# Patient Record
Sex: MALE | Race: WHITE | NOT HISPANIC OR LATINO | Employment: FULL TIME | URBAN - NONMETROPOLITAN AREA
[De-identification: names, ages, dates, MRNs, and addresses within clinical notes are randomized per-mention and may not be internally consistent; named-entity substitution may affect disease eponyms.]

---

## 2022-11-08 ENCOUNTER — OFFICE VISIT (OUTPATIENT)
Dept: URGENT CARE | Facility: CLINIC | Age: 47
End: 2022-11-08

## 2022-11-08 VITALS
RESPIRATION RATE: 20 BRPM | WEIGHT: 202 LBS | DIASTOLIC BLOOD PRESSURE: 66 MMHG | TEMPERATURE: 98.3 F | SYSTOLIC BLOOD PRESSURE: 113 MMHG | HEART RATE: 80 BPM | OXYGEN SATURATION: 97 %

## 2022-11-08 DIAGNOSIS — M54.50 ACUTE RIGHT-SIDED LOW BACK PAIN WITHOUT SCIATICA: Primary | ICD-10-CM

## 2022-11-08 RX ORDER — METHYLPREDNISOLONE SODIUM SUCCINATE 125 MG/2ML
125 INJECTION, POWDER, LYOPHILIZED, FOR SOLUTION INTRAMUSCULAR; INTRAVENOUS ONCE
Status: DISCONTINUED | OUTPATIENT
Start: 2022-11-08 | End: 2022-11-08

## 2022-11-08 RX ORDER — METHOCARBAMOL 750 MG/1
750 TABLET, FILM COATED ORAL EVERY 8 HOURS SCHEDULED
Qty: 15 TABLET | Refills: 0 | Status: SHIPPED | OUTPATIENT
Start: 2022-11-08 | End: 2022-11-13

## 2022-11-08 RX ORDER — METHYLPREDNISOLONE SODIUM SUCCINATE 125 MG/2ML
125 INJECTION, POWDER, LYOPHILIZED, FOR SOLUTION INTRAMUSCULAR; INTRAVENOUS ONCE
Status: COMPLETED | OUTPATIENT
Start: 2022-11-08 | End: 2022-11-08

## 2022-11-08 RX ADMIN — METHYLPREDNISOLONE SODIUM SUCCINATE 125 MG: 125 INJECTION, POWDER, LYOPHILIZED, FOR SOLUTION INTRAMUSCULAR; INTRAVENOUS at 15:34

## 2022-11-08 NOTE — PROGRESS NOTES
[facility]        NAME: Chioma Hawkins is a 50 y o  male  : 1975    MRN: [id number]  DATE: 2022  TIME: 3:30 PM    Assessment and Plan   Acute right-sided low back pain without sciatica [M54 50]  1  Acute right-sided low back pain without sciatica  methocarbamol (Robaxin-750) 750 mg tablet    methylPREDNISolone sodium succinate (Solu-MEDROL) injection 125 mg    DISCONTINUED: methylPREDNISolone sodium succinate (Solu-MEDROL) injection 125 mg         Patient Instructions   Patient Instructions   Back Pain   WHAT YOU NEED TO KNOW:   Back pain is common  You may have back pain and muscle spasms  You may feel sore or stiff on one or both sides of your back  The pain may spread to your lower body  Conditions that affect the spine, joints, or muscles can cause back pain  These may include arthritis, spinal stenosis (narrowing of the spinal column), muscle tension, or breakdown of the spinal discs  DISCHARGE INSTRUCTIONS:   Call your local emergency number (911 in the [address] [place] [address],[address]) if:   · You have severe back pain with chest pain  · You cannot control your urine or bowel movements  · Your pain becomes so severe that you cannot walk  Return to the emergency department if:   · You have pain, numbness, or weakness in one or both legs  · You have severe back pain, nausea, and vomiting  · You have severe back pain that spreads to your side or genital area  Call your doctor if:   · You have back pain that does not get better with rest and pain medicine  · You have a fever  · You have pain that worsens when you are on your back or when you rest     · You have pain that worsens when you cough or sneeze  · You lose weight without trying  · You have questions or concerns about your condition or care  Medicines: You may need any of the following:  · NSAIDs , such as ibuprofen, help decrease swelling, pain, and fever   This medicine is available with or without a doctor's order  NSAIDs can cause stomach bleeding or kidney problems in certain people  If you take blood thinner medicine, always ask your healthcare provider if NSAIDs are safe for you  Always read the medicine label and follow directions  · Acetaminophen  decreases pain and fever  It is available without a doctor's order  Ask how much to take and how often to take it  Follow directions  Read the labels of all other medicines you are using to see if they also contain acetaminophen, or ask your doctor or pharmacist  Acetaminophen can cause liver damage if not taken correctly  Do not use more than 4 grams (4,000 milligrams) total of acetaminophen in one day  · Muscle relaxers  help decrease muscle spasms and back pain  · Prescription pain medicine  may be given  Ask your healthcare provider how to take this medicine safely  Some prescription pain medicines contain acetaminophen  Do not take other medicines that contain acetaminophen without talking to your healthcare provider  Too much acetaminophen may cause liver damage  Prescription pain medicine may cause constipation  Ask your healthcare provider how to prevent or treat constipation  · Take your medicine as directed  Contact your healthcare provider if you think your medicine is not helping or if you have side effects  Tell him or her if you are allergic to any medicine  Keep a list of the medicines, vitamins, and herbs you take  Include the amounts, and when and why you take them  Bring the list or the pill bottles to follow-up visits  Carry your medicine list with you in case of an emergency  How to manage your back pain:   · Apply ice  on your back for 15 to 20 minutes every hour or as directed  Use an ice pack, or put crushed ice in a plastic bag  Cover it with a towel before you apply it to your skin  Ice helps prevent tissue damage and decreases pain      · Apply heat  on your back for 20 to 30 minutes every 2 hours for as many days as directed  Heat helps decrease pain and muscle spasms  · Stay active  as much as you can without causing more pain  Bed rest could make your back pain worse  Avoid heavy lifting until your pain is gone  · Go to physical therapy as directed  A physical therapist can teach you exercises to help improve movement and strength, and to decrease pain  Follow up with your doctor in 2 weeks, or as directed: You might need to see a specialist  Write down your questions so you remember to ask them during your visits  © Copyright [employer] 2022 Information is for End User's use only and may not be sold, redistributed or otherwise used for commercial purposes  All illustrations and images included in CareNotes® are the copyrighted property of A D A M , Inc  or [employer]   The above information is an  only  It is not intended as medical advice for individual conditions or treatments  Talk to your doctor, nurse or pharmacist before following any medical regimen to see if it is safe and effective for you  Follow up with PCP in 3-5 days  Proceed to  ER if symptoms worsen  Chief Complaint     Chief Complaint   Patient presents with   • Back Pain     Pulled muscle in lower back on Sunday while raking leaves  History of Present Illness       The patient is a 47-year-old male who presents to the clinic complaining of lower back pain that started on Sunday  He states that he noticed the pain after raking leaves this on Sunday  He describes the pain as a sharp pain that is located on the right lower back and radiates to his right hip  He denies radiation to his legs  The patient also denies saddle numbness, numbness of his legs, or generalized weakness  He states he does not have a history of significant lumbar disc disease but does state that he had lower back pain in the past which was many years ago    He states that he has been taking Tylenol for his pain which does help a little bit as he cannot take NSAIDs due to history of ulcers  He states that he can tolerate steroids  He denies associated fevers, chills, nausea, vomiting, or urinary symptoms  Review of Systems   Review of Systems   Constitutional: Negative for chills and fever  HENT: Negative for ear pain and sore throat  Eyes: Negative for pain and visual disturbance  Respiratory: Negative for cough and shortness of breath  Cardiovascular: Negative for chest pain and palpitations  Gastrointestinal: Negative for abdominal pain and vomiting  Genitourinary: Negative for dysuria and hematuria  Musculoskeletal: Positive for back pain and joint swelling  Negative for arthralgias  Skin: Negative for color change and rash  Neurological: Negative for seizures and syncope  All other systems reviewed and are negative  Current Medications       Current Outpatient Medications:   •  methocarbamol (Robaxin-750) 750 mg tablet, Take 1 tablet (750 mg total) by mouth every 8 (eight) hours for 5 days, Disp: 15 tablet, Rfl: 0    Current Facility-Administered Medications:   •  methylPREDNISolone sodium succinate (Solu-MEDROL) injection 125 mg, 125 mg, Intramuscular, Once, [facility][1], [place]-    Current Allergies     Allergies as of 11/08/2022 - Reviewed 11/08/2022   Allergen Reaction Noted   • Penicillins Edema 11/08/2022            The following portions of the patient's history were reviewed and updated as appropriate: allergies, current medications, past family history, past medical history, past social history, past surgical history and problem list      History reviewed  No pertinent past medical history  History reviewed  No pertinent surgical history  History reviewed  No pertinent family history  Medications have been verified          Objective   /66   Pulse 80   Temp 98 3 °F (36 8 °C)   Resp 20   Wt 91 6 kg (202 lb)   SpO2 97%        Physical Exam     Physical Exam  Constitutional:       Appearance: He is well-developed  HENT:      Head: Normocephalic  Eyes:      General:         Left eye: No discharge  Pupils: Pupils are equal, round, and reactive to light  Neck:      Thyroid: No thyromegaly  Trachea: No tracheal deviation  Cardiovascular:      Rate and Rhythm: Normal rate and regular rhythm  Heart sounds: No murmur heard  Pulmonary:      Effort: Pulmonary effort is normal  No respiratory distress  Breath sounds: No wheezing or rales  Chest:      Chest wall: No tenderness  Abdominal:      General: Bowel sounds are normal  There is no distension  Palpations: Abdomen is soft  There is no mass  Tenderness: There is no abdominal tenderness  There is no guarding or rebound  Musculoskeletal:      Cervical back: Normal range of motion  Lumbar back: Spasms present  No swelling, edema, deformity, signs of trauma, lacerations or bony tenderness  Normal range of motion  Positive left straight leg raise test         Back:       Comments: -there is paraspinal tenderness noted at the right lower lumbar  There is no significant edema noted in the spinal area  The patient does have a positive straight leg test on the right  He has limited range of motion to flexion, extension, and rotation of the LS spine  Skin:     General: Skin is warm  Neurological:      Mental Status: He is alert               -the patient's symptoms are likely muscular as he does not have any signs or symptoms of radiculopathy  I will treat him with a steroid injection and muscle relaxer  I suggest follow-up with PCP or go to the ER if symptoms worsen

## 2022-11-08 NOTE — LETTER
November 8, 2022     Patient: Latosha Garza   YOB: 1975   Date of Visit: 11/8/2022       To Whom it May Concern:    Ricardo Hawkins was seen in my clinic on 11/8/2022  He may return to work on 11/14/2022  If you have any questions or concerns, please don't hesitate to call  Sincerely,          Carol Orozco PA-C        CC: Eva Merida

## 2022-11-08 NOTE — PATIENT INSTRUCTIONS
Back Pain   WHAT YOU NEED TO KNOW:   Back pain is common  You may have back pain and muscle spasms  You may feel sore or stiff on one or both sides of your back  The pain may spread to your lower body  Conditions that affect the spine, joints, or muscles can cause back pain  These may include arthritis, spinal stenosis (narrowing of the spinal column), muscle tension, or breakdown of the spinal discs  DISCHARGE INSTRUCTIONS:   Call your local emergency number (911 in the [address] [facility],[address]) if:   You have severe back pain with chest pain  You cannot control your urine or bowel movements  Your pain becomes so severe that you cannot walk  Return to the emergency department if:   You have pain, numbness, or weakness in one or both legs  You have severe back pain, nausea, and vomiting  You have severe back pain that spreads to your side or genital area  Call your doctor if:   You have back pain that does not get better with rest and pain medicine  You have a fever  You have pain that worsens when you are on your back or when you rest     You have pain that worsens when you cough or sneeze  You lose weight without trying  You have questions or concerns about your condition or care  Medicines: You may need any of the following:  NSAIDs , such as ibuprofen, help decrease swelling, pain, and fever  This medicine is available with or without a doctor's order  NSAIDs can cause stomach bleeding or kidney problems in certain people  If you take blood thinner medicine, always ask your healthcare provider if NSAIDs are safe for you  Always read the medicine label and follow directions  Acetaminophen  decreases pain and fever  It is available without a doctor's order  Ask how much to take and how often to take it  Follow directions   Read the labels of all other medicines you are using to see if they also contain acetaminophen, or ask your doctor or pharmacist  Acetaminophen can cause liver damage if not taken correctly  Do not use more than 4 grams (4,000 milligrams) total of acetaminophen in one day  Muscle relaxers  help decrease muscle spasms and back pain  Prescription pain medicine  may be given  Ask your healthcare provider how to take this medicine safely  Some prescription pain medicines contain acetaminophen  Do not take other medicines that contain acetaminophen without talking to your healthcare provider  Too much acetaminophen may cause liver damage  Prescription pain medicine may cause constipation  Ask your healthcare provider how to prevent or treat constipation  Take your medicine as directed  Contact your healthcare provider if you think your medicine is not helping or if you have side effects  Tell him or her if you are allergic to any medicine  Keep a list of the medicines, vitamins, and herbs you take  Include the amounts, and when and why you take them  Bring the list or the pill bottles to follow-up visits  Carry your medicine list with you in case of an emergency  How to manage your back pain:   Apply ice  on your back for 15 to 20 minutes every hour or as directed  Use an ice pack, or put crushed ice in a plastic bag  Cover it with a towel before you apply it to your skin  Ice helps prevent tissue damage and decreases pain  Apply heat  on your back for 20 to 30 minutes every 2 hours for as many days as directed  Heat helps decrease pain and muscle spasms  Stay active  as much as you can without causing more pain  Bed rest could make your back pain worse  Avoid heavy lifting until your pain is gone  Go to physical therapy as directed  A physical therapist can teach you exercises to help improve movement and strength, and to decrease pain  Follow up with your doctor in 2 weeks, or as directed: You might need to see a specialist  Write down your questions so you remember to ask them during your visits    © Copyright [employer] 2022 Information is for End User's use only and may not be sold, redistributed or otherwise used for commercial purposes  All illustrations and images included in CareNotes® are the copyrighted property of A [employer] A M , Inc  or [employer]  The above information is an  only  It is not intended as medical advice for individual conditions or treatments  Talk to your doctor, nurse or pharmacist before following any medical regimen to see if it is safe and effective for you

## 2023-12-20 ENCOUNTER — OFFICE VISIT (OUTPATIENT)
Dept: URGENT CARE | Facility: CLINIC | Age: 48
End: 2023-12-20
Payer: COMMERCIAL

## 2023-12-20 VITALS
OXYGEN SATURATION: 97 % | RESPIRATION RATE: 20 BRPM | DIASTOLIC BLOOD PRESSURE: 76 MMHG | HEART RATE: 91 BPM | SYSTOLIC BLOOD PRESSURE: 136 MMHG | TEMPERATURE: 98.7 F | WEIGHT: 204.8 LBS

## 2023-12-20 DIAGNOSIS — H92.02 OTALGIA, LEFT EAR: ICD-10-CM

## 2023-12-20 DIAGNOSIS — J06.9 ACUTE RESPIRATORY DISEASE: Primary | ICD-10-CM

## 2023-12-20 LAB
SARS-COV-2 AG UPPER RESP QL IA: NEGATIVE
VALID CONTROL: NORMAL

## 2023-12-20 PROCEDURE — 87811 SARS-COV-2 COVID19 W/OPTIC: CPT

## 2023-12-20 PROCEDURE — G0382 LEV 3 HOSP TYPE B ED VISIT: HCPCS

## 2023-12-20 RX ORDER — FLUTICASONE PROPIONATE 50 MCG
1 SPRAY, SUSPENSION (ML) NASAL 2 TIMES DAILY
Qty: 9.9 ML | Refills: 0 | Status: SHIPPED | OUTPATIENT
Start: 2023-12-20

## 2023-12-20 NOTE — PROGRESS NOTES
"  [facility]        NAME: Riky Lieberman is a 48 y.o. male  : 1975    MRN: [id number]  DATE: 2023  TIME: 3:47 PM    Assessment and Plan   Acute respiratory disease [J06.9]  1. Acute respiratory disease  Poct Covid 19 Rapid Antigen Test    fluticasone (FLONASE) 50 mcg/act nasal spray      2. Otalgia, left ear          Rapid covid negative  No ear infection. Recommend supportive care.    Patient Instructions   Pt appears to have a viral upper respiratory infection. Antibiotics are contraindicated at this time and would not help you feel better faster.     Although the symptoms are troublesome, usually the patient's body is able to recover from a viral infection on an average time of 7-10 days.  Fever, if any, typically resolves after 3-5 days.  If patient has sore throat, typically this resolves within 3-5 days.  Any nasal congestion, runny nose, post nasal drip typically begin to  improve after 7-10 days.  Any cough may linger over a couple weeks.  Please note that having a cough is not necessarily a bad thing.  It often times is part of our body's protective mechanism to help keep our airways clear.      Please note that yellow mucous doesn't necessarily mean a \"bacterial\" infection.  Yellow mucous doesn't automatically mean that an antibiotic is needed.  It is not unusual for mucus to become more discolored in the days after the start of an upper respiratory infection.  Often times this is due to mucous that has thickened  with white blood cells that have flooded the mucosa to try and fight the viral infection.      Ear Pain may occur when the eustachian tubes become blocked with mucous or swollen due to acute inflammation from illness. May give Ibuprofen or Tylenol as needed for comfort.  May also use warm compress against ear for comfort.  If ear ache is persisting and not improving over 2-3 days or if there is any gross drainage coming from ear, please seek further " evaluation.      Natural remedies to help provide comfort for cough/ cold symptoms include: one teaspoon of honey (only in infants over 1 year of age), increased vitamin C (oranges, mady, etc.), maddi, and drinking plenty of fluids. Vaporizer by bedside.    If you should have prolonged symptoms (Greater than 10 days), worsening symptoms, or any new symptoms please seek further medical attention.    If you would be having difficulty breathing, seek further evaluation by calling 911 or proceeding to ER for further evaluation.     Follow up with PCP in 3-5 days.  Proceed to  ER if symptoms worsen.    Chief Complaint     Chief Complaint   Patient presents with    Cold Like Symptoms     Left earache, post nasal drip and cough since Saturday.  Using Tylenol.         History of Present Illness       Patient is a 48 year old male who presents to the office today for left ear pain and cough, congestion, rhinorrhea. Symptoms started on Saturday. Denies fever. Ear feels wet. Has some hearing loss in that ear.         Review of Systems   Review of Systems   Constitutional:  Negative for chills and fever.   HENT:  Positive for congestion, ear pain, hearing loss and rhinorrhea. Negative for tinnitus.    Respiratory:  Positive for cough. Negative for shortness of breath and wheezing.    Cardiovascular:  Negative for chest pain and palpitations.   Gastrointestinal:  Negative for diarrhea, nausea and vomiting.   All other systems reviewed and are negative.        Current Medications       Current Outpatient Medications:     fluticasone (FLONASE) 50 mcg/act nasal spray, 1 spray into each nostril 2 (two) times a day, Disp: 9.9 mL, Rfl: 0    methocarbamol (Robaxin-750) 750 mg tablet, Take 1 tablet (750 mg total) by mouth every 8 (eight) hours for 5 days, Disp: 15 tablet, Rfl: 0    Current Allergies     Allergies as of 12/20/2023 - Reviewed 12/20/2023   Allergen Reaction Noted    Penicillins Edema 11/08/2022            The following  portions of the patient's history were reviewed and updated as appropriate: allergies, current medications, past family history, past medical history, past social history, past surgical history and problem list.     History reviewed. No pertinent past medical history.    History reviewed. No pertinent surgical history.    History reviewed. No pertinent family history.      Medications have been verified.        Objective   /76   Pulse 91   Temp 98.7 °F (37.1 °C)   Resp 20   Wt 92.9 kg (204 lb 12.8 oz)   SpO2 97%        Physical Exam     Physical Exam  Vitals and nursing note reviewed.   Constitutional:       Appearance: Normal appearance. He is normal weight.   HENT:      Right Ear: A middle ear effusion (serous) is present.      Left Ear: A middle ear effusion (serous) is present.      Nose: Congestion present.      Mouth/Throat:      Mouth: Mucous membranes are moist.   Cardiovascular:      Rate and Rhythm: Normal rate and regular rhythm.      Pulses: Normal pulses.      Heart sounds: Normal heart sounds.   Pulmonary:      Effort: Pulmonary effort is normal.      Breath sounds: Normal breath sounds.   Musculoskeletal:         General: Normal range of motion.   Skin:     General: Skin is warm.   Neurological:      Mental Status: He is alert.

## 2023-12-20 NOTE — LETTER
December 20, 2023     Patient: Riky Lieberman   YOB: 1975   Date of Visit: 12/20/2023       To Whom It May Concern:    It is my medical opinion that Riky Lieberman may return to work on 12/21/2023 .    If you have any questions or concerns, please don't hesitate to call.         Sincerely,        DRE Diaz    CC: No Recipients

## 2023-12-20 NOTE — PATIENT INSTRUCTIONS
"Pt appears to have a viral upper respiratory infection. Antibiotics are contraindicated at this time and would not help you feel better faster.     Although the symptoms are troublesome, usually the patient's body is able to recover from a viral infection on an average time of 7-10 days.  Fever, if any, typically resolves after 3-5 days.  If patient has sore throat, typically this resolves within 3-5 days.  Any nasal congestion, runny nose, post nasal drip typically begin to  improve after 7-10 days.  Any cough may linger over a couple weeks.  Please note that having a cough is not necessarily a bad thing.  It often times is part of our body's protective mechanism to help keep our airways clear.      Please note that yellow mucous doesn't necessarily mean a \"bacterial\" infection.  Yellow mucous doesn't automatically mean that an antibiotic is needed.  It is not unusual for mucus to become more discolored in the days after the start of an upper respiratory infection.  Often times this is due to mucous that has thickened  with white blood cells that have flooded the mucosa to try and fight the viral infection.      Ear Pain may occur when the eustachian tubes become blocked with mucous or swollen due to acute inflammation from illness. May give Ibuprofen or Tylenol as needed for comfort.  May also use warm compress against ear for comfort.  If ear ache is persisting and not improving over 2-3 days or if there is any gross drainage coming from ear, please seek further evaluation.      Natural remedies to help provide comfort for cough/ cold symptoms include: one teaspoon of honey (only in infants over 1 year of age), increased vitamin C (oranges, mady, etc.), ginger, and drinking plenty of fluids. Vaporizer by bedside.    If you should have prolonged symptoms (Greater than 10 days), worsening symptoms, or any new symptoms please seek further medical attention.    If you would be having difficulty breathing, seek " further evaluation by calling 911 or proceeding to ER for further evaluation.

## 2024-07-25 ENCOUNTER — OFFICE VISIT (OUTPATIENT)
Dept: URGENT CARE | Facility: CLINIC | Age: 49
End: 2024-07-25
Payer: COMMERCIAL

## 2024-07-25 VITALS
HEART RATE: 72 BPM | DIASTOLIC BLOOD PRESSURE: 60 MMHG | TEMPERATURE: 98.1 F | RESPIRATION RATE: 18 BRPM | SYSTOLIC BLOOD PRESSURE: 110 MMHG | OXYGEN SATURATION: 96 %

## 2024-07-25 DIAGNOSIS — I80.3 PHLEBITIS OF RIGHT LEG: Primary | ICD-10-CM

## 2024-07-25 PROCEDURE — G0382 LEV 3 HOSP TYPE B ED VISIT: HCPCS | Performed by: PHYSICIAN ASSISTANT

## 2024-07-25 RX ORDER — CLINDAMYCIN HYDROCHLORIDE 300 MG/1
300 CAPSULE ORAL 4 TIMES DAILY
Qty: 28 CAPSULE | Refills: 0 | Status: SHIPPED | OUTPATIENT
Start: 2024-07-25 | End: 2024-08-01

## 2024-07-25 NOTE — PROGRESS NOTES
[facility]        NAME: Riky Lieberman is a 48 y.o. male  : 1975    MRN: [id number]  DATE: 2024  TIME: 1:07 PM    Assessment and Plan   Phlebitis of right leg [I80.3]  1. Phlebitis of right leg  clindamycin (CLEOCIN) 300 MG capsule            Patient Instructions   There are no Patient Instructions on file for this visit.      Follow up with PCP in 3-5 days.  Proceed to  ER if symptoms worsen.    Chief Complaint     Chief Complaint   Patient presents with    Leg Pain     Right leg, pain/swelling, noticed it Saturday night         History of Present Illness       Patient is a 48-year-old male with past medical history significant for varicose veins who presents the clinic for pain in his right lower extremity.  He states that he was doing a large amount of walking over the weekend.  He noticed increased swelling over his right leg.  He also noticed some warmth but denies a significant amount of redness.  He was taking 200 mg of ibuprofen 3 times a day for the last few days and the swelling and pain has decreased slightly.  He states that he did miss work due to his pain and needs a note for work.  He denies associated calf tenderness, chest pains, shortness of breath, or hemoptysis.        Review of Systems   Review of Systems   Constitutional:  Negative for chills and fever.   Respiratory:  Negative for apnea.    Cardiovascular:  Negative for chest pain, palpitations and leg swelling.   Musculoskeletal:  Positive for arthralgias.   Skin:  Positive for color change. Negative for pallor, rash and wound.   Neurological:  Negative for numbness.         Current Medications       Current Outpatient Medications:     clindamycin (CLEOCIN) 300 MG capsule, Take 1 capsule (300 mg total) by mouth 4 (four) times a day for 7 days, Disp: 28 capsule, Rfl: 0    Current Allergies     Allergies as of 2024 - Reviewed 2024   Allergen Reaction Noted    Penicillins Edema 2022             The following portions of the patient's history were reviewed and updated as appropriate: allergies, current medications, past family history, past medical history, past social history, past surgical history and problem list.     History reviewed. No pertinent past medical history.    History reviewed. No pertinent surgical history.    No family history on file.      Medications have been verified.        Objective   /60   Pulse 72   Temp 98.1 °F (36.7 °C)   Resp 18   SpO2 96%        Physical Exam     Physical Exam  Musculoskeletal:        Legs:       Comments: -Varicosities are noted on the right lower extremity with warmth noted on the right medial lower extremity.  There is no significant calf tenderness.  Homans' sign is negative.   -There are no signs of acute DVT.         -I suggest using compression stockings.  He declines an NSAID at this time and states that he will use over-the-counter medications.  I will place him on Keflex for infection control.  I did suggest ER if symptoms worsen.  He may need a venous Doppler if redness or swelling increases.

## 2024-07-25 NOTE — LETTER
July 25, 2024     Patient: Riky Lieberman   YOB: 1975   Date of Visit: 7/25/2024       To Whom it May Concern:    Riky Lieberman was seen in my clinic on 7/25/2024. He  was off work 7/24 and 7/25  If you have any questions or concerns, please don't hesitate to call.         Sincerely,          Gonzalez Recinos PA-C        CC: No Recipients

## 2024-09-10 ENCOUNTER — OFFICE VISIT (OUTPATIENT)
Dept: URGENT CARE | Facility: CLINIC | Age: 49
End: 2024-09-10
Payer: COMMERCIAL

## 2024-09-10 VITALS
WEIGHT: 199 LBS | BODY MASS INDEX: 29.47 KG/M2 | OXYGEN SATURATION: 97 % | HEIGHT: 69 IN | HEART RATE: 81 BPM | TEMPERATURE: 97.9 F | SYSTOLIC BLOOD PRESSURE: 116 MMHG | DIASTOLIC BLOOD PRESSURE: 74 MMHG

## 2024-09-10 DIAGNOSIS — R10.9 ABDOMINAL PAIN, UNSPECIFIED ABDOMINAL LOCATION: ICD-10-CM

## 2024-09-10 DIAGNOSIS — R05.1 ACUTE COUGH: Primary | ICD-10-CM

## 2024-09-10 PROCEDURE — G0382 LEV 3 HOSP TYPE B ED VISIT: HCPCS

## 2024-09-10 RX ORDER — DEXTROMETHORPHAN HYDROBROMIDE AND PROMETHAZINE HYDROCHLORIDE 15; 6.25 MG/5ML; MG/5ML
5 SYRUP ORAL 4 TIMES DAILY PRN
Qty: 180 ML | Refills: 0 | Status: SHIPPED | OUTPATIENT
Start: 2024-09-10

## 2024-09-10 NOTE — LETTER
September 10, 2024     Patient: Riky Lieberman   YOB: 1975   Date of Visit: 9/10/2024       To Whom It May Concern:    It is my medical opinion that Riyk Lieberman may return to work on 9/12/2024 .    If you have any questions or concerns, please don't hesitate to call.         Sincerely,        DRE Diaz    CC: No Recipients

## 2024-09-10 NOTE — PATIENT INSTRUCTIONS
Please use the  cough syrup as directed as needed.  Apply heat to the area and use ibuprofen as directed on bottles.

## 2024-09-10 NOTE — PROGRESS NOTES
[facility]        NAME: Riky Lieberman is a 48 y.o. male  : 1975    MRN: [id number]  DATE: 2024  TIME: 9:03 AM    Assessment and Plan   Acute cough [R05.1]  1. Acute cough  promethazine-dextromethorphan (PHENERGAN-DM) 6.25-15 mg/5 mL oral syrup      2. Abdominal pain, unspecified abdominal location          Pain is from coughing. Will trial cough syrup and recommend supportive measures. Work note provided    Patient Instructions   Please use the  cough syrup as directed as needed.  Apply heat to the area and use ibuprofen as directed on bottles.    Follow up with PCP in 3-5 days.  Proceed to  ER if symptoms worsen.    Chief Complaint     Chief Complaint   Patient presents with    Cough     8 days    Flank Pain     From coughing         History of Present Illness       Patient is a 48 year old male who presents to the office today for a cough. Notes that he had a coughing fit last night at work and pinched something on the left side and now has pain. Is taking motrin and inhaler with some relief.     Cough    Flank Pain        Review of Systems   Review of Systems   Respiratory:  Positive for cough.    Genitourinary:  Positive for flank pain.   All other systems reviewed and are negative.        Current Medications       Current Outpatient Medications:     promethazine-dextromethorphan (PHENERGAN-DM) 6.25-15 mg/5 mL oral syrup, Take 5 mL by mouth 4 (four) times a day as needed for cough, Disp: 180 mL, Rfl: 0    Current Allergies     Allergies as of 09/10/2024 - Reviewed 09/10/2024   Allergen Reaction Noted    Penicillins Edema and Swelling 2022            The following portions of the patient's history were reviewed and updated as appropriate: allergies, current medications, past family history, past medical history, past social history, past surgical history and problem list.     Past Medical History:   Diagnosis Date    Asymptomatic varicose veins        History  "reviewed. No pertinent surgical history.    Family History   Problem Relation Age of Onset    No Known Problems Mother     Heart failure Father     Varicose Veins Father          Medications have been verified.        Objective   /74   Pulse 81   Temp 97.9 °F (36.6 °C)   Ht 5' 9\" (1.753 m)   Wt 90.3 kg (199 lb)   SpO2 97%   BMI 29.39 kg/m²        Physical Exam     Physical Exam  Vitals and nursing note reviewed.   Constitutional:       Appearance: Normal appearance. He is normal weight.   Cardiovascular:      Rate and Rhythm: Normal rate and regular rhythm.      Pulses: Normal pulses.      Heart sounds: Normal heart sounds.   Pulmonary:      Effort: Pulmonary effort is normal.      Breath sounds: Normal breath sounds.   Abdominal:      General: There is no distension.      Palpations: Abdomen is soft. There is no mass.      Tenderness: There is no abdominal tenderness. There is no right CVA tenderness, left CVA tenderness, guarding or rebound.      Hernia: No hernia is present.   Musculoskeletal:         General: Tenderness present.        Arms:    Skin:     General: Skin is warm.      Capillary Refill: Capillary refill takes less than 2 seconds.   Neurological:      Mental Status: He is alert.                   "

## 2025-01-09 ENCOUNTER — OFFICE VISIT (OUTPATIENT)
Dept: URGENT CARE | Facility: CLINIC | Age: 50
End: 2025-01-09
Payer: COMMERCIAL

## 2025-01-09 VITALS
OXYGEN SATURATION: 98 % | TEMPERATURE: 98.6 F | RESPIRATION RATE: 16 BRPM | DIASTOLIC BLOOD PRESSURE: 73 MMHG | HEART RATE: 68 BPM | SYSTOLIC BLOOD PRESSURE: 127 MMHG

## 2025-01-09 DIAGNOSIS — H57.89 EYE REDNESS: Primary | ICD-10-CM

## 2025-01-09 PROCEDURE — G0382 LEV 3 HOSP TYPE B ED VISIT: HCPCS | Performed by: PHYSICIAN ASSISTANT

## 2025-01-09 RX ORDER — PREDNISOLONE ACETATE 10 MG/ML
1 SUSPENSION/ DROPS OPHTHALMIC 4 TIMES DAILY
Qty: 2.5 ML | Refills: 0 | Status: SHIPPED | OUTPATIENT
Start: 2025-01-09 | End: 2025-02-03

## 2025-01-09 RX ORDER — TETRACAINE HYDROCHLORIDE 5 MG/ML
1 SOLUTION OPHTHALMIC ONCE
Status: COMPLETED | OUTPATIENT
Start: 2025-01-09 | End: 2025-01-09

## 2025-01-09 RX ORDER — KETOTIFEN FUMARATE 0.35 MG/ML
1 SOLUTION/ DROPS OPHTHALMIC 2 TIMES DAILY PRN
Qty: 3 ML | Refills: 0 | Status: SHIPPED | OUTPATIENT
Start: 2025-01-09

## 2025-01-09 RX ORDER — OFLOXACIN 3 MG/ML
1 SOLUTION/ DROPS OPHTHALMIC 4 TIMES DAILY
Qty: 5 ML | Refills: 0 | Status: SHIPPED | OUTPATIENT
Start: 2025-01-09 | End: 2025-01-16

## 2025-01-09 RX ADMIN — TETRACAINE HYDROCHLORIDE 1 DROP: 5 SOLUTION OPHTHALMIC at 09:48

## 2025-01-09 NOTE — PROGRESS NOTES
[facility]        NAME: Riky Lieberman is a 49 y.o. male  : 1975    MRN: [id number]  DATE: 2025  TIME: 9:49 AM    Assessment and Plan   Eye redness [H57.89]  1. Eye redness  tetracaine 0.5 % ophthalmic solution 1 drop    fluorescein sodium sterile 1 mg ophthalmic strip 1 strip    Ketotifen Fumarate (ZADITOR) 0.035 % ophthalmic solution    ofloxacin (OCUFLOX) 0.3 % ophthalmic solution    prednisoLONE acetate (PRED FORTE) 1 % ophthalmic suspension    Ambulatory Referral to Ophthalmology    Foreign body removal          Results    See vision results in chart.     Patient Instructions     Assessment & Plan      Drops as prescribed  Do not wear contact lenses for duration of treatment  Some pain will return after office administered drops wear off  Follow up with ophthalmologist within 24 hours  Wear eye protection during high-risk activities when appropriate  Tylenol/Ibuprofen for pain  Avoid eye patching  Follow up with PCP in 3-5 days.  Proceed to  ER if symptoms worsen.    If tests have been performed at [facility], our office will contact you with results if changes need to be made to the care plan discussed with you at the visit.  [facility] on [facility]    Chief Complaint     Chief Complaint   Patient presents with    Eye Problem     Left eye irritation for a few weeks          History of Present Illness     History of Present Illness      Eye Problem   The left eye is affected. This is a new problem. The current episode started 1 to 4 weeks ago. There was no injury mechanism (patient is a  but states he wears PPE. 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 States symptoms are not similar.). He Does not wear contacts. Associated symptoms include blurred vision (intermittent), eye redness and photophobia. Pertinent negatives include no eye discharge, foreign body sensation, nausea or vomiting.       Review of Systems   Review of Systems    HENT: Negative.     Eyes:  Positive for blurred vision (intermittent), photophobia, redness and visual disturbance (intermittent). Negative for pain and discharge.   Gastrointestinal:  Negative for nausea and vomiting.   Neurological:  Negative for dizziness and headaches.         Current Medications       Current Outpatient Medications:     Ketotifen Fumarate (ZADITOR) 0.035 % ophthalmic solution, Administer 1 drop into the left eye 2 (two) times a day as needed (allergen), Disp: 3 mL, Rfl: 0    ofloxacin (OCUFLOX) 0.3 % ophthalmic solution, Administer 1 drop into the left eye 4 (four) times a day for 7 days, Disp: 5 mL, Rfl: 0    prednisoLONE acetate (PRED FORTE) 1 % ophthalmic suspension, Administer 1 drop into the left eye 4 (four) times a day for 25 days, Disp: 2.5 mL, Rfl: 0    promethazine-dextromethorphan (PHENERGAN-DM) 6.25-15 mg/5 mL oral syrup, Take 5 mL by mouth 4 (four) times a day as needed for cough, Disp: 180 mL, Rfl: 0  No current facility-administered medications for this visit.    Current Allergies     Allergies as of 01/09/2025 - Reviewed 01/09/2025   Allergen Reaction Noted    Penicillins Edema and Swelling 07/22/2022            The following portions of the patient's history were reviewed and updated as appropriate: allergies, current medications, past family history, past medical history, past social history, past surgical history and problem list.     Past Medical History:   Diagnosis Date    Asymptomatic varicose veins        History reviewed. No pertinent surgical history.    Family History   Problem Relation Age of Onset    No Known Problems Mother     Heart failure Father     Varicose Veins Father          Medications have been verified.        Objective   /73   Pulse 68   Temp 98.6 °F (37 °C)   Resp 16   SpO2 98%   No LMP for male patient.       Physical Exam     Physical Exam  Vitals reviewed.   Constitutional:       General: He is not in acute distress.     Appearance: He  is well-developed. He is not diaphoretic.   HENT:      Head: Normocephalic and atraumatic.      Nose: Nose normal.   Eyes:      General:         Right eye: No discharge.         Left eye: No discharge.      Extraocular Movements: Extraocular movements intact.      Pupils: Pupils are equal, round, and reactive to light.        Comments: L conjunctiva erythematous.    Cardiovascular:      Rate and Rhythm: Normal rate and regular rhythm.      Heart sounds: Normal heart sounds.   Pulmonary:      Effort: Pulmonary effort is normal. No respiratory distress.      Breath sounds: Normal breath sounds.   Skin:     General: Skin is warm.   Neurological:      Mental Status: He is alert. Mental status is at baseline.   Psychiatric:         Behavior: Behavior normal.         Thought Content: Thought content normal.         Judgment: Judgment normal.         Universal Protocol:  Consent: Verbal consent obtained.  Risks and benefits: risks, benefits and alternatives were discussed  Consent given by: patient  Patient identity confirmed: verbally with patient  Foreign body removal    Date/Time: 1/9/2025 9:30 AM    Performed by: Crystal Call PA-C  Authorized by: Crystal Call PA-C  Body area: eye  Location details: left conjunctiva    Anesthesia:  Local Anesthetic: tetracaine drops  Comments: After patient consent was obtained, 1 drop of tetracaine and fluorescein stain was administered into L eye. Direct visualization was achieved with UV light with abrasion. No evidence of foreign body.

## 2025-01-09 NOTE — LETTER
January 9, 2025     Patient: Riky Lieberman   YOB: 1975   Date of Visit: 1/9/2025       To Whom It May Concern:    It is my medical opinion that Riky Lieberman may return to work on 1/10/2025 .    If you have any questions or concerns, please don't hesitate to call.         Sincerely,        Crystal Call PA-C    CC: No Recipients

## 2025-01-09 NOTE — PATIENT INSTRUCTIONS
Drops as prescribed  Do not wear contact lenses for duration of treatment  Some pain will return after office administered drops wear off  Follow up with ophthalmologist within 24 hours  Wear eye protection during high-risk activities when appropriate  Tylenol/Ibuprofen for pain  Avoid eye patching  Follow up with PCP in 3-5 days.  Proceed to  ER if symptoms worsen.    If tests have been performed at [facility], our office will contact you with results if changes need to be made to the care plan discussed with you at the visit.  You can review your full results on [facility] My[facility].